# Patient Record
Sex: MALE | Race: OTHER | NOT HISPANIC OR LATINO
[De-identification: names, ages, dates, MRNs, and addresses within clinical notes are randomized per-mention and may not be internally consistent; named-entity substitution may affect disease eponyms.]

---

## 2017-01-10 ENCOUNTER — RX RENEWAL (OUTPATIENT)
Age: 57
End: 2017-01-10

## 2017-03-08 ENCOUNTER — RX RENEWAL (OUTPATIENT)
Age: 57
End: 2017-03-08

## 2017-04-28 ENCOUNTER — RX RENEWAL (OUTPATIENT)
Age: 57
End: 2017-04-28

## 2017-10-16 ENCOUNTER — APPOINTMENT (OUTPATIENT)
Dept: UROLOGY | Facility: CLINIC | Age: 57
End: 2017-10-16
Payer: COMMERCIAL

## 2017-10-16 VITALS
HEART RATE: 64 BPM | WEIGHT: 170 LBS | DIASTOLIC BLOOD PRESSURE: 80 MMHG | SYSTOLIC BLOOD PRESSURE: 145 MMHG | BODY MASS INDEX: 21.82 KG/M2 | HEIGHT: 74 IN | RESPIRATION RATE: 16 BRPM | TEMPERATURE: 98.2 F

## 2017-10-16 VITALS
SYSTOLIC BLOOD PRESSURE: 151 MMHG | DIASTOLIC BLOOD PRESSURE: 83 MMHG | RESPIRATION RATE: 16 BRPM | HEART RATE: 57 BPM | TEMPERATURE: 97.9 F

## 2017-10-16 PROCEDURE — 99213 OFFICE O/P EST LOW 20 MIN: CPT

## 2017-10-17 LAB
HIV1+2 AB SPEC QL IA.RAPID: NONREACTIVE
T PALLIDUM AB SER QL IA: NEGATIVE

## 2017-10-18 LAB
HCV AB SER QL: NONREACTIVE
HCV S/CO RATIO: 0.15 S/CO

## 2017-10-23 LAB — GONORRHOEAE AB: NORMAL

## 2018-04-20 ENCOUNTER — APPOINTMENT (OUTPATIENT)
Dept: UROLOGY | Facility: CLINIC | Age: 58
End: 2018-04-20
Payer: COMMERCIAL

## 2018-04-20 ENCOUNTER — OUTPATIENT (OUTPATIENT)
Dept: OUTPATIENT SERVICES | Facility: HOSPITAL | Age: 58
LOS: 1 days | End: 2018-04-20
Payer: COMMERCIAL

## 2018-04-20 VITALS
HEIGHT: 74 IN | DIASTOLIC BLOOD PRESSURE: 92 MMHG | WEIGHT: 170 LBS | BODY MASS INDEX: 21.82 KG/M2 | TEMPERATURE: 97.4 F | HEART RATE: 68 BPM | RESPIRATION RATE: 18 BRPM | SYSTOLIC BLOOD PRESSURE: 153 MMHG

## 2018-04-20 DIAGNOSIS — R35.0 FREQUENCY OF MICTURITION: ICD-10-CM

## 2018-04-20 PROCEDURE — 76775 US EXAM ABDO BACK WALL LIM: CPT | Mod: 26

## 2018-04-20 PROCEDURE — 76775 US EXAM ABDO BACK WALL LIM: CPT

## 2018-04-20 PROCEDURE — 99213 OFFICE O/P EST LOW 20 MIN: CPT | Mod: 25

## 2018-04-23 DIAGNOSIS — N20.0 CALCULUS OF KIDNEY: ICD-10-CM

## 2018-04-23 LAB — PSA SERPL-MCNC: 1.16 NG/ML

## 2018-10-22 ENCOUNTER — APPOINTMENT (OUTPATIENT)
Dept: UROLOGY | Facility: CLINIC | Age: 58
End: 2018-10-22

## 2018-12-10 ENCOUNTER — RX RENEWAL (OUTPATIENT)
Age: 58
End: 2018-12-10

## 2019-06-13 ENCOUNTER — RX RENEWAL (OUTPATIENT)
Age: 59
End: 2019-06-13

## 2019-10-14 ENCOUNTER — APPOINTMENT (OUTPATIENT)
Dept: UROLOGY | Facility: CLINIC | Age: 59
End: 2019-10-14
Payer: COMMERCIAL

## 2019-10-14 ENCOUNTER — OUTPATIENT (OUTPATIENT)
Dept: OUTPATIENT SERVICES | Facility: HOSPITAL | Age: 59
LOS: 1 days | End: 2019-10-14
Payer: COMMERCIAL

## 2019-10-14 VITALS
HEIGHT: 74 IN | SYSTOLIC BLOOD PRESSURE: 135 MMHG | BODY MASS INDEX: 21.82 KG/M2 | RESPIRATION RATE: 17 BRPM | TEMPERATURE: 98.3 F | WEIGHT: 170 LBS | HEART RATE: 84 BPM | DIASTOLIC BLOOD PRESSURE: 87 MMHG

## 2019-10-14 DIAGNOSIS — R35.0 FREQUENCY OF MICTURITION: ICD-10-CM

## 2019-10-14 PROCEDURE — 76775 US EXAM ABDO BACK WALL LIM: CPT

## 2019-10-14 PROCEDURE — 76775 US EXAM ABDO BACK WALL LIM: CPT | Mod: 26

## 2019-10-14 PROCEDURE — 99213 OFFICE O/P EST LOW 20 MIN: CPT | Mod: 25

## 2019-10-14 RX ORDER — ALLOPURINOL 300 MG/1
300 TABLET ORAL DAILY
Qty: 90 | Refills: 3 | Status: ACTIVE | COMMUNITY
Start: 2019-10-14 | End: 1900-01-01

## 2019-10-14 NOTE — PHYSICAL EXAM
[General Appearance - Well Nourished] : well nourished [General Appearance - Well Developed] : well developed [Normal Appearance] : normal appearance [Well Groomed] : well groomed [General Appearance - In No Acute Distress] : no acute distress [Abdomen Soft] : soft [Costovertebral Angle Tenderness] : no ~M costovertebral angle tenderness [Abdomen Tenderness] : non-tender [Urethral Meatus] : meatus normal [Urinary Bladder Findings] : the bladder was normal on palpation [Scrotum] : the scrotum was normal [Testes Mass (___cm)] : there were no testicular masses [No Prostate Nodules] : no prostate nodules [Edema] : no peripheral edema [] : no respiratory distress [Respiration, Rhythm And Depth] : normal respiratory rhythm and effort [Exaggerated Use Of Accessory Muscles For Inspiration] : no accessory muscle use [Oriented To Time, Place, And Person] : oriented to person, place, and time [Affect] : the affect was normal [Mood] : the mood was normal [Not Anxious] : not anxious [Normal Station and Gait] : the gait and station were normal for the patient's age [No Palpable Adenopathy] : no palpable adenopathy [No Focal Deficits] : no focal deficits [Prostate Size ___ gm] : prostate size [unfilled] gm [FreeTextEntry1] : smooth and regular

## 2019-10-14 NOTE — HISTORY OF PRESENT ILLNESS
[FreeTextEntry1] : 59 year old male presents for evaluation of test results. \par Pt reports some recent diarrhea when he eats after avoiding eating. \par PSA was 1.16 on 04/20/18\par Pt has been taking his medication consistent for the most part, but has not been taking HCTZ consistently. \par \par US renal today shows no evidence of stones bilaterally. There is a simple 2.6 x 2.8 x 2.7cm mid pole cyst in the right kidney and a 1.0cm lower pole cyst in the left kidney. Both kidneys are normal in size and echogenicity without hydronephrosis or solid masses visualized. \par LL on 10/03/19 shows good output, SS CaOx, elevated Ca, low citrates and low urinary pH\par Pt was advised to increase fluid intake. \par Advised to drink more lemonade and orange juice. \par Renewed Allopurinol, K Citrate and HCTZ. \par LL in 5 months and RTO in 6 months for results and US

## 2019-10-14 NOTE — ADDENDUM
[FreeTextEntry1] :  I, Steph Mahmood, acted solely as a scribe for Dr. Yonathan Faulkner. The documentation recorded by the scribe accurately reflects the service I personally performed and the decision by me.\par

## 2019-10-14 NOTE — ASSESSMENT
[FreeTextEntry1] : US renal today shows no evidence of stones bilaterally. There is a simple 2.6 x 2.8 x 2.7cm mid pole cyst in the right kidney and a 1.0cm lower pole cyst in the left kidney. Both kidneys are normal in size and echogenicity without hydronephrosis or solid masses visualized. \par LL on 10/03/19 shows good output, SS CaOx, elevated Ca, low citrates and low urinary pH\par Pt was advised to increase fluid intake. \par Advised to drink more lemonade and orange juice. \par Renewed Allopurinol, K Citrate and HCTZ. \par LL in 5 months and RTO in 6 months for results and US

## 2019-10-17 DIAGNOSIS — N20.0 CALCULUS OF KIDNEY: ICD-10-CM

## 2020-11-19 RX ORDER — POTASSIUM CITRATE 10 MEQ/1
10 MEQ TABLET, EXTENDED RELEASE ORAL TWICE DAILY
Qty: 180 | Refills: 0 | Status: ACTIVE | COMMUNITY
Start: 2019-10-14 | End: 1900-01-01

## 2021-02-18 ENCOUNTER — APPOINTMENT (OUTPATIENT)
Dept: UROLOGY | Facility: CLINIC | Age: 61
End: 2021-02-18
Payer: COMMERCIAL

## 2021-02-18 PROCEDURE — 99202 OFFICE O/P NEW SF 15 MIN: CPT | Mod: 95

## 2021-02-18 NOTE — HISTORY OF PRESENT ILLNESS
[Other Location: e.g. School (Enter Location, City,State)___] : at [unfilled], at the time of the visit. [FreeTextEntry1] : Pt.is well \par No new symptoms \par LL Showed decreased output,l ow pH, low citrate and SS UA. He also has a high urinary Na. \par Advised to increase K citrate to I bid and OJ , Decrease Na intake  \par No nocturia and no US \par Schedule office visit for rectal exam,renal US and PSA\par Pt.also has a hernia and I referred him to [facility] [Urinary Incontinence] : no urinary incontinence [Urinary Retention] : no urinary retention [Urinary Urgency] : no urinary urgency [Urinary Frequency] : no urinary frequency [Nocturia] : no nocturia [Straining] : no straining [Weak Stream] : no weak stream [Intermittency] : no intermittency [Post-Void Dribbling] : no post-void dribbling [Erectile Dysfunction] : no Erectile Dysfunction [Dysuria] : no dysuria [Hematuria - Gross] : no gross hematuria [Hematuria - Microscopic] : no microscopic hematuria [Bladder Spasm] : no bladder spasm [Abdominal Pain] : no abdominal pain [Flank Pain] : no flank pain [Weight Loss] : no recent ~M [unfilled] weight loss [Fever] : no fever [Fatigue] : no fatigue [Nausea] : no nausea [Anorexia] : no anorexia [Incisional Drainage] : no incisional drainage [Incisional Pain] : no incisional pain [None] : None

## 2021-02-18 NOTE — ASSESSMENT
[FreeTextEntry1] : hist of stones. Advised re diet \par Requires office visit for US , PSA and check rectal exam.

## 2021-03-22 ENCOUNTER — APPOINTMENT (OUTPATIENT)
Dept: SURGERY | Facility: CLINIC | Age: 61
End: 2021-03-22
Payer: COMMERCIAL

## 2021-03-22 ENCOUNTER — OUTPATIENT (OUTPATIENT)
Dept: OUTPATIENT SERVICES | Facility: HOSPITAL | Age: 61
LOS: 1 days | End: 2021-03-22
Payer: COMMERCIAL

## 2021-03-22 ENCOUNTER — APPOINTMENT (OUTPATIENT)
Dept: UROLOGY | Facility: CLINIC | Age: 61
End: 2021-03-22
Payer: COMMERCIAL

## 2021-03-22 VITALS
WEIGHT: 171 LBS | HEART RATE: 93 BPM | SYSTOLIC BLOOD PRESSURE: 124 MMHG | DIASTOLIC BLOOD PRESSURE: 83 MMHG | BODY MASS INDEX: 21.94 KG/M2 | TEMPERATURE: 98.36 F | HEIGHT: 74 IN

## 2021-03-22 DIAGNOSIS — R35.0 FREQUENCY OF MICTURITION: ICD-10-CM

## 2021-03-22 PROCEDURE — 76775 US EXAM ABDO BACK WALL LIM: CPT

## 2021-03-22 PROCEDURE — 99072 ADDL SUPL MATRL&STAF TM PHE: CPT

## 2021-03-22 PROCEDURE — 76775 US EXAM ABDO BACK WALL LIM: CPT | Mod: 26

## 2021-03-22 PROCEDURE — 99213 OFFICE O/P EST LOW 20 MIN: CPT | Mod: 25

## 2021-03-22 PROCEDURE — 99243 OFF/OP CNSLTJ NEW/EST LOW 30: CPT

## 2021-03-22 NOTE — ASSESSMENT
[FreeTextEntry1] : 59 yo male with a LIH and umbilical hernia. Will schedule out patient Laps repair

## 2021-03-22 NOTE — PHYSICAL EXAM
[General Appearance - Well Developed] : well developed [General Appearance - Well Nourished] : well nourished [Normal Appearance] : normal appearance [Well Groomed] : well groomed [General Appearance - In No Acute Distress] : no acute distress [Anus Abnormality] : the anus and perineum were normal [Rectal Exam - Rectum] : digital rectal exam was normal [Prostate Tenderness] : the prostate was not tender [No Prostate Nodules] : no prostate nodules [Prostate Size ___ gm] : prostate size [unfilled] gm

## 2021-03-22 NOTE — HISTORY OF PRESENT ILLNESS
[de-identified] : 61 yo healthy male presents for evaluation of a left inguinal hernia which he has had for some time. He states recent increase in discomfort especially after prolonged  coughing and sneezing. He denies any h/o hernia repairs in the past. He denies any nausea or vomiting.

## 2021-03-22 NOTE — ASSESSMENT
[FreeTextEntry1] : Renal US today:\par Right kidney: 11.5 x 4.9 x 5.1 cm \par Cortical Thickness: 1.6 cm UP 1.6 cm LP \par \par Left kidney: 12.3 x 5.4 x 4.9 cm		 \par Cortical Thickness: 1.6 cm UP 1.7 cm LP \par  \par Echogenicity: Normal\par \par Bladder: Not visualized \par \par Findings:Again visualized a 2.5 cm x 1.9 cm x 2.6 cm exophytic cyst with a 8.2 mm x 5.5 mm calcification in mid pole of the right kidney. Again visualized a 1.1 cm x 1.1 cm 1.2 cm simple cyst in the lower pole of the left kidney. Both kidneys are normal in size and echogenicity without hydronephrosis, stones or solid masses visualized. \par LL discussed previously \par Asymptomatic \par PSA ordered \par LL in 5 months and US in 6 months

## 2021-03-22 NOTE — PHYSICAL EXAM
[Respiratory Effort] : normal respiratory effort [Alert] : alert [Oriented to Person] : oriented to person [Oriented to Place] : oriented to place [Oriented to Time] : oriented to time [Calm] : calm [JVD] : no jugular venous distention  [Abdominal Masses] : No abdominal masses [Abdomen Tenderness] : ~T ~M No abdominal tenderness [de-identified] : KAVON DAVIS NAD [de-identified] : EOMI [de-identified] : soft NT ND + reducible LIH no obvious RIH appreciated. tiny umbilical hernia

## 2021-03-23 LAB — PSA SERPL-MCNC: 0.94 NG/ML

## 2021-03-25 DIAGNOSIS — N20.0 CALCULUS OF KIDNEY: ICD-10-CM

## 2021-03-25 DIAGNOSIS — Z87.442 PERSONAL HISTORY OF URINARY CALCULI: ICD-10-CM

## 2021-03-25 DIAGNOSIS — N28.1 CYST OF KIDNEY, ACQUIRED: ICD-10-CM

## 2021-08-23 ENCOUNTER — APPOINTMENT (OUTPATIENT)
Dept: SURGERY | Facility: CLINIC | Age: 61
End: 2021-08-23
Payer: COMMERCIAL

## 2021-08-23 VITALS
BODY MASS INDEX: 22.59 KG/M2 | SYSTOLIC BLOOD PRESSURE: 148 MMHG | HEIGHT: 74 IN | OXYGEN SATURATION: 95 % | TEMPERATURE: 97.8 F | WEIGHT: 176 LBS | DIASTOLIC BLOOD PRESSURE: 83 MMHG | HEART RATE: 59 BPM

## 2021-08-23 VITALS — SYSTOLIC BLOOD PRESSURE: 149 MMHG | DIASTOLIC BLOOD PRESSURE: 83 MMHG

## 2021-08-23 PROCEDURE — 99204 OFFICE O/P NEW MOD 45 MIN: CPT

## 2021-08-23 NOTE — REVIEW OF SYSTEMS
[As Noted in HPI] : as noted in HPI [Fever] : no fever [Chills] : no chills [Eye Pain] : no eye pain [Red Eyes] : eyes not red [Earache] : no earache [Loss Of Hearing] : no hearing loss [Heart Rate Is Slow] : the heart rate was not slow [Heart Rate Is Fast] : the heart rate was not fast [Shortness Of Breath] : no shortness of breath [Wheezing] : no wheezing [Abdominal Pain] : no abdominal pain [Vomiting] : no vomiting [Constipation] : no constipation [Diarrhea] : no diarrhea [Dysuria] : no dysuria [Incontinence] : no incontinence [Hesitancy] : no urinary hesitancy [Nocturia] : no nocturia [Joint Swelling] : no joint swelling [Joint Stiffness] : no joint stiffness [Skin Lesions] : no skin lesions [Skin Wound] : no skin wound [Confused] : no confusion [Convulsions] : no convulsions [Suicidal] : not suicidal [Sleep Disturbances] : no sleep disturbances [Proptosis] : no proptosis [Hot Flashes] : no hot flashes [Easy Bleeding] : no tendency for easy bleeding [Easy Bruising] : no tendency for easy bruising

## 2021-08-23 NOTE — HISTORY OF PRESENT ILLNESS
[de-identified] : 60M presents with 10month history of L groin bulge that is increasingly bothersome. Worse with valsalva and prolonged standing. No dysuria.\par No episodes of incarceration or acute changes in bowel function.\par No nausea/vomiting.\par No blood per rectum\par No testicular pain.\par Long-standing umbilical hernia as well.

## 2021-08-23 NOTE — PHYSICAL EXAM
[Normal Breath Sounds] : Normal breath sounds [Normal Heart Sounds] : normal heart sounds [Normal Rate and Rhythm] : normal rate and rhythm [No Rash or Lesion] : No rash or lesion [No HSM] : no hepatosplenomegaly [Alert] : alert [Oriented to Person] : oriented to person [Oriented to Place] : oriented to place [Oriented to Time] : oriented to time [Calm] : calm [JVD] : no jugular venous distention  [de-identified] : well-appearing, NAD [de-identified] : RASHARD [de-identified] : soft, ND, NT, +reducible umbilical hernia, +reducible R and L inguinal hernias, no erythema. [de-identified] : no CVAT [de-identified] : full ROM

## 2021-08-23 NOTE — ASSESSMENT
[FreeTextEntry1] : 60M with reducible/symptomatic bilateral inguinal hernias and umbilical hernia. Recommend robotic repair.

## 2021-09-17 ENCOUNTER — APPOINTMENT (OUTPATIENT)
Dept: UROLOGY | Facility: CLINIC | Age: 61
End: 2021-09-17

## 2021-11-08 ENCOUNTER — OUTPATIENT (OUTPATIENT)
Dept: OUTPATIENT SERVICES | Facility: HOSPITAL | Age: 61
LOS: 1 days | End: 2021-11-08
Payer: COMMERCIAL

## 2021-11-08 VITALS
OXYGEN SATURATION: 98 % | RESPIRATION RATE: 16 BRPM | TEMPERATURE: 97 F | HEART RATE: 60 BPM | SYSTOLIC BLOOD PRESSURE: 122 MMHG | DIASTOLIC BLOOD PRESSURE: 60 MMHG | WEIGHT: 174.61 LBS | HEIGHT: 74 IN

## 2021-11-08 DIAGNOSIS — Z98.890 OTHER SPECIFIED POSTPROCEDURAL STATES: Chronic | ICD-10-CM

## 2021-11-08 DIAGNOSIS — K40.90 UNILATERAL INGUINAL HERNIA, WITHOUT OBSTRUCTION OR GANGRENE, NOT SPECIFIED AS RECURRENT: ICD-10-CM

## 2021-11-08 DIAGNOSIS — E06.3 AUTOIMMUNE THYROIDITIS: ICD-10-CM

## 2021-11-08 DIAGNOSIS — Z92.29 PERSONAL HISTORY OF OTHER DRUG THERAPY: ICD-10-CM

## 2021-11-08 DIAGNOSIS — K40.20 BILATERAL INGUINAL HERNIA, WITHOUT OBSTRUCTION OR GANGRENE, NOT SPECIFIED AS RECURRENT: ICD-10-CM

## 2021-11-08 DIAGNOSIS — Z29.9 ENCOUNTER FOR PROPHYLACTIC MEASURES, UNSPECIFIED: ICD-10-CM

## 2021-11-08 DIAGNOSIS — Z01.818 ENCOUNTER FOR OTHER PREPROCEDURAL EXAMINATION: ICD-10-CM

## 2021-11-08 DIAGNOSIS — K42.9 UMBILICAL HERNIA WITHOUT OBSTRUCTION OR GANGRENE: ICD-10-CM

## 2021-11-08 DIAGNOSIS — Z13.89 ENCOUNTER FOR SCREENING FOR OTHER DISORDER: ICD-10-CM

## 2021-11-08 LAB
A1C WITH ESTIMATED AVERAGE GLUCOSE RESULT: 5.4 % — SIGNIFICANT CHANGE UP (ref 4–5.6)
ALBUMIN SERPL ELPH-MCNC: 4.3 G/DL — SIGNIFICANT CHANGE UP (ref 3.3–5.2)
ALP SERPL-CCNC: 62 U/L — SIGNIFICANT CHANGE UP (ref 40–120)
ALT FLD-CCNC: 20 U/L — SIGNIFICANT CHANGE UP
ANION GAP SERPL CALC-SCNC: 10 MMOL/L — SIGNIFICANT CHANGE UP (ref 5–17)
APTT BLD: 33.6 SEC — SIGNIFICANT CHANGE UP (ref 27.5–35.5)
AST SERPL-CCNC: 20 U/L — SIGNIFICANT CHANGE UP
BASOPHILS # BLD AUTO: 0.07 K/UL — SIGNIFICANT CHANGE UP (ref 0–0.2)
BASOPHILS NFR BLD AUTO: 0.8 % — SIGNIFICANT CHANGE UP (ref 0–2)
BILIRUB SERPL-MCNC: 0.4 MG/DL — SIGNIFICANT CHANGE UP (ref 0.4–2)
BLD GP AB SCN SERPL QL: SIGNIFICANT CHANGE UP
BUN SERPL-MCNC: 21.1 MG/DL — HIGH (ref 8–20)
CALCIUM SERPL-MCNC: 9.5 MG/DL — SIGNIFICANT CHANGE UP (ref 8.6–10.2)
CHLORIDE SERPL-SCNC: 106 MMOL/L — SIGNIFICANT CHANGE UP (ref 98–107)
CO2 SERPL-SCNC: 25 MMOL/L — SIGNIFICANT CHANGE UP (ref 22–29)
CREAT SERPL-MCNC: 1.18 MG/DL — SIGNIFICANT CHANGE UP (ref 0.5–1.3)
EOSINOPHIL # BLD AUTO: 0.31 K/UL — SIGNIFICANT CHANGE UP (ref 0–0.5)
EOSINOPHIL NFR BLD AUTO: 3.8 % — SIGNIFICANT CHANGE UP (ref 0–6)
ESTIMATED AVERAGE GLUCOSE: 108 MG/DL — SIGNIFICANT CHANGE UP (ref 68–114)
GLUCOSE SERPL-MCNC: 100 MG/DL — HIGH (ref 70–99)
HCT VFR BLD CALC: 46.2 % — SIGNIFICANT CHANGE UP (ref 39–50)
HGB BLD-MCNC: 15.7 G/DL — SIGNIFICANT CHANGE UP (ref 13–17)
IMM GRANULOCYTES NFR BLD AUTO: 0.2 % — SIGNIFICANT CHANGE UP (ref 0–1.5)
INR BLD: 1.03 RATIO — SIGNIFICANT CHANGE UP (ref 0.88–1.16)
LYMPHOCYTES # BLD AUTO: 2.33 K/UL — SIGNIFICANT CHANGE UP (ref 1–3.3)
LYMPHOCYTES # BLD AUTO: 28.2 % — SIGNIFICANT CHANGE UP (ref 13–44)
MCHC RBC-ENTMCNC: 32.4 PG — SIGNIFICANT CHANGE UP (ref 27–34)
MCHC RBC-ENTMCNC: 34 GM/DL — SIGNIFICANT CHANGE UP (ref 32–36)
MCV RBC AUTO: 95.5 FL — SIGNIFICANT CHANGE UP (ref 80–100)
MONOCYTES # BLD AUTO: 0.63 K/UL — SIGNIFICANT CHANGE UP (ref 0–0.9)
MONOCYTES NFR BLD AUTO: 7.6 % — SIGNIFICANT CHANGE UP (ref 2–14)
MRSA PCR RESULT.: SIGNIFICANT CHANGE UP
NEUTROPHILS # BLD AUTO: 4.9 K/UL — SIGNIFICANT CHANGE UP (ref 1.8–7.4)
NEUTROPHILS NFR BLD AUTO: 59.4 % — SIGNIFICANT CHANGE UP (ref 43–77)
PLATELET # BLD AUTO: 176 K/UL — SIGNIFICANT CHANGE UP (ref 150–400)
POTASSIUM SERPL-MCNC: 4.3 MMOL/L — SIGNIFICANT CHANGE UP (ref 3.5–5.3)
POTASSIUM SERPL-SCNC: 4.3 MMOL/L — SIGNIFICANT CHANGE UP (ref 3.5–5.3)
PROT SERPL-MCNC: 7.1 G/DL — SIGNIFICANT CHANGE UP (ref 6.6–8.7)
PROTHROM AB SERPL-ACNC: 11.9 SEC — SIGNIFICANT CHANGE UP (ref 10.6–13.6)
RBC # BLD: 4.84 M/UL — SIGNIFICANT CHANGE UP (ref 4.2–5.8)
RBC # FLD: 12.9 % — SIGNIFICANT CHANGE UP (ref 10.3–14.5)
S AUREUS DNA NOSE QL NAA+PROBE: SIGNIFICANT CHANGE UP
SODIUM SERPL-SCNC: 141 MMOL/L — SIGNIFICANT CHANGE UP (ref 135–145)
WBC # BLD: 8.26 K/UL — SIGNIFICANT CHANGE UP (ref 3.8–10.5)
WBC # FLD AUTO: 8.26 K/UL — SIGNIFICANT CHANGE UP (ref 3.8–10.5)

## 2021-11-08 PROCEDURE — G0463: CPT

## 2021-11-08 PROCEDURE — 93005 ELECTROCARDIOGRAM TRACING: CPT

## 2021-11-08 PROCEDURE — 93010 ELECTROCARDIOGRAM REPORT: CPT

## 2021-11-08 RX ORDER — CEFAZOLIN SODIUM 1 G
2000 VIAL (EA) INJECTION ONCE
Refills: 0 | Status: COMPLETED | OUTPATIENT
Start: 2021-11-24 | End: 2021-11-24

## 2021-11-08 NOTE — H&P PST ADULT - PROBLEM SELECTOR PLAN 2
Moderna vaccine completed 4/19/21  Booster received 8/24/21  Patient to schedule COVID test for 11/21.

## 2021-11-08 NOTE — H&P PST ADULT - ASSESSMENT
This is a pleasant 61 year old male in NAD  with history of kidney stones, squamous cell carcinoma and hashimoto's thyroiditis presents today for PST for upcoming surgical procedure. Patient with bilateral inguinal hernia and umbilical hernia.  Now scheduled for robotic assisted bilateral inguinal hernia repair and umbilical hernia repair with Dr. Morris pending medical clearance on .      CAPRINI SCORE    AGE RELATED RISK FACTORS                                                             [ ] Age 41-60 years                                            (1 Point)  [X ] Age: 61-74 years                                           (2 Points)                 [ ] Age= 75 years                                                (3 Points)             DISEASE RELATED RISK FACTORS                                                       [ ] Edema in the lower extremities                 (1 Point)                     [ ] Varicose veins                                               (1 Point)                                 [ ] BMI > 25 Kg/m2                                            (1 Point)                                  [ ] Serious infection (ie PNA)                            (1 Point)                     [ ] Lung disease ( COPD, Emphysema)            (1 Point)                                                                          [ ] Acute myocardial infarction                         (1 Point)                  [ ] Congestive heart failure (in the previous month)  (1 Point)         [ ] Inflammatory bowel disease                            (1 Point)                  [ ] Central venous access, PICC or Port               (2 points)       (within the last month)                                                                [ ] Stroke (in the previous month)                        (5 Points)    [X ] Previous or present malignancy                       (2 points)                                                                                                                                                         HEMATOLOGY RELATED FACTORS                                                         [ ] Prior episodes of VTE                                     (3 Points)                     [ ] Positive family history for VTE                      (3 Points)                  [ ] Prothrombin 40631 A                                     (3 Points)                     [ ] Factor V Leiden                                                (3 Points)                        [ ] Lupus anticoagulants                                      (3 Points)                                                           [ ] Anticardiolipin antibodies                              (3 Points)                                                       [ ] High homocysteine in the blood                   (3 Points)                                             [ ] Other congenital or acquired thrombophilia      (3 Points)                                                [ ] Heparin induced thrombocytopenia                  (3 Points)                                        MOBILITY RELATED FACTORS  [ ] Bed rest                                                         (1 Point)  [ ] Plaster cast                                                    (2 points)  [ ] Bed bound for more than 72 hours           (2 Points)    GENDER SPECIFIC FACTORS  [ ] Pregnancy or had a baby within the last month   (1 Point)  [ ] Post-partum < 6 weeks                                   (1 Point)  [ ] Hormonal therapy  or oral contraception   (1 Point)  [ ] History of pregnancy complications              (1 point)  [ ] Unexplained or recurrent              (1 Point)    OTHER RISK FACTORS                                           (1 Point)  [X ] BMI >40, smoking, diabetes requiring insulin, chemotherapy  blood transfusions and length of surgery over 2 hours    SURGERY RELATED RISK FACTORS  [ ]  Section within the last month     (1 Point)  [ ] Minor surgery                                                  (1 Point)  [ ] Arthroscopic surgery                                       (2 Points)  [X ] Planned major surgery lasting more            (2 Points)      than 45 minutes     [ ] Elective hip or knee joint replacement       (5 points)       surgery                                                TRAUMA RELATED RISK FACTORS  [ ] Fracture of the hip, pelvis, or leg                       (5 Points)  [ ] Spinal cord injury resulting in paralysis             (5 points)       (in the previous month)    [ ] Paralysis  (less than 1 month)                             (5 Points)  [ ] Multiple Trauma within 1 month                        (5 Points)    Total Score [     7   ]    Caprini Score 0-2: Low Risk, NO VTE prophylaxis required for most patients, encourage ambulation  Caprini Score 3-6: Moderate Risk , pharmacologic VTE prophylaxis is indicated for most patients (in the absence of contraindications)  Caprini Score Greater than or =7: High risk, pharmocologic VTE prophylaxis indicated for most patients (in the absence of contraindications)      OPIOID RISK TOOL    ALONZO EACH BOX THAT APPLIES AND ADD TOTALS AT THE END    FAMILY HISTORY OF SUBSTANCE ABUSE                 FEMALE         MALE                                                Alcohol                             [  ]1 pt          [  ]3pts                                               Illegal Durgs                     [  ]2 pts        [  ]3pts                                               Rx Drugs                           [  ]4 pts        [  ]4 pts    PERSONAL HISTORY OF SUBSTANCE ABUSE                                                                                          Alcohol                             [  ]3 pts       [  ]3 pts                                               Illegal Drugs                     [  ]4 pts        [  ]4 pts                                               Rx Drugs                           [  ]5 pts        [  ]5 pts    AGE BETWEEN 16-45 YEARS                                      [  ]1 pt         [  ]1 pt    HISTORY OF PREADOLESCENT   SEXUAL ABUSE                                                             [  ]3 pts        [  ]0pts    PSYCHOLOGICAL DISEASE                     ADD, OCD, Bipolar, Schizophrenia        [  ]2 pts         [  ]2 pts                      Depression                                               [  ]1 pt           [  ]1 pt           SCORING TOTAL   (add numbers and type here)              (*0**)                                     A score of 3 or lower indicated LOW risk for future opioid abuse  A score of 4 to 7 indicated moderate risk for future opioid abuse  A score of 8 or higher indicates a high risk for opioid abuse

## 2021-11-08 NOTE — H&P PST ADULT - PROBLEM SELECTOR PROBLEM 5
Unilateral inguinal hernia, without obstruction or gangrene, not specified as recurrent Umbilical hernia without obstruction or gangrene

## 2021-11-08 NOTE — H&P PST ADULT - GASTROINTESTINAL COMMENTS
umbilical hernia x15 years, protruding left inguinal hernia reducible umbilical hernia, reducible right inguinal hernia, large reducible left inguinal hernia

## 2021-11-08 NOTE — H&P PST ADULT - NSICDXPASTMEDICALHX_GEN_ALL_CORE_FT
PAST MEDICAL HISTORY:  COVID-19 vaccine series completed moderna x3    H/O squamous cell carcinoma of skin     Hashimoto's thyroiditis     History of kidney stones

## 2021-11-08 NOTE — H&P PST ADULT - PROBLEM SELECTOR PLAN 5
Labs, EKG and MRSA/MSSA performed. Written and verbal instructions provided, verbalized understanding.  Now scheduled for robotic assisted bilateral inguinal hernia repair and umbilical hernia repair with Dr. Morris pending medical clearance on 11/24.   Patient to have medical clearance with Dr. Matamoros.  Patient educated on surgical scrub, COVID testing 11/21, preadmission instructions, medical clearance and day of procedure medications, verbalizes understanding, teach back method utilized.  Patient instructed to stop ASA/Herbals or anti-inflammatory meds one week prior to surgery and discuss with PCP

## 2021-11-08 NOTE — H&P PST ADULT - PROBLEM SELECTOR PLAN 1
Continue medications as instructed.  Patient instructed to take levothyroxine with a sip of water morning of procedure, verbalizes understanding.  Medical clearance pending.

## 2021-11-08 NOTE — H&P PST ADULT - HISTORY OF PRESENT ILLNESS
61 year old male with history of   presents today for PST  61 year old male with history of   presents today for PST   with history of umbilical hernia   reports September 2019 after lifting tree  progressively worsened  burning sensation with bowel movements, sneezing   61 year old male with history of kidney stones, squamous cell carcinoma and hashimoto's thyroiditis presents today for PST for upcoming surgical procedure. Patient reports left inguinal hernia since September 2019 after lifting a tree. States hernia has progressively worsened and "bulge" has progressively gotten bigger. Reports a burning sensation with bowel movements, sneezing and prolonged standing. Reports longstanding history of umbilical hernia, with no pain. Reports occasional diet related diarrhea. Denies nausea, vomiting, constipation or testicular pain. Denies chest pain, palpitations or shortness of breath. Now scheduled for robotic assisted bilateral inguinal hernia repair and umbilical hernia repair with Dr. Morris pending medical clearance on 11/24.

## 2021-11-08 NOTE — H&P PST ADULT - LIVES WITH, PROFILE
placement need - homeless. Pt  was seen evaluated and discharged last evening in the Ed after having an anxiety attack. Pt requests placement with [facility]. Whilel conversing with [facility], pt expressed "If I do not get admitted I am going to jump in front of a car". Pt admits to this not being true, only wants placement.
spouse

## 2021-11-08 NOTE — H&P PST ADULT - NSANTHOSAYNRD_GEN_A_CORE
No. CARMEL screening performed.  STOP BANG Legend: 0-2 = LOW Risk; 3-4 = INTERMEDIATE Risk; 5-8 = HIGH Risk

## 2021-11-08 NOTE — H&P PST ADULT - NSICDXPASTSURGICALHX_GEN_ALL_CORE_FT
PAST SURGICAL HISTORY:  H/O colonoscopy      PAST SURGICAL HISTORY:  H/O colonoscopy     History of lithotripsy     History of squamous cell carcinoma excision scalp    Status post Mohs surgery torso, nose

## 2021-11-08 NOTE — H&P PST ADULT - PROBLEM SELECTOR PROBLEM 6
Umbilical hernia without obstruction or gangrene Bilateral inguinal hernia without obstruction or gangrene, recurrence not specified

## 2021-11-08 NOTE — H&P PST ADULT - VASCULAR
OT ACUTE Treatment Session    Pt seen on 9S nursing unit.                                                          Frequency Comments: X  M,T,Th,F      Admitting complaint:: Non-Stemi  Elevated troponin                                                                                         Precautions  Cardiac Precautions: Yes (06/07/19 1125)  Other Precautions: fall risk (06/07/19 1125)      ASSESSMENT:  Treatment today focused on functional mobility, standing balance/tolerance and overall activity tolerance for progressing independence in self cares.   Current overall ADL status is supervision/set up to complete bathing, grooming and UB dressing tasks given verbal cues to initiate and min assist for washing of distal LE's due to limited reach.  Current functional mobility for ADL and Instrumental-ADL tasks is overall supervision for safety in sit to stand transfers, maintaining standing balance for self cares and min assist for balance, safety and support ambulating household distances with w/w.  Patient  displays  fair progress demonstrated by good effort and particiption.    Limitations at this time include weakness, fatigue, cognitive deficits, balance and medical status. Patient will benefit from further skilled OT for continued training with increasing strength and activity tolerance to help the patient meet goal of maximizing safety and independence in mobility and self cares; discharge planning options and therapy progress made to date with Patient.       RECOMMENDATIONS FOR DISCHARGE:  Recommendations for Discharge: OT: Sub-acute nursing home (06/07/19 1125)    OT Identified Barriers to Discharge: weakness, fatigue, medical status, overall safety     PT/OT Mobility Equipment for Discharge: has 4ww at home (05/30/19 1100)  PT/OT ADL Equipment for Discharge: may benefit from shower chair; continue to assess (06/07/19 1125)    Assistance needed when returning home:   Discussed plan for patient to discharge to  sub acute rehab facility for ongoing rehabilitation due to ongoing functional limitations    ICU Mobility Assesment (PERME):         PLAN: Continue skilled OT, including the following Treatment Interventions: ADL retraining;Functional transfer training;Endurance training;Cognitive reorientation;Equipment eval/education;Compensatory technique education;Continued evaluation (06/07/19 1125)   Treatment Plan for Next Session: ambulate to bathroom for toileting and self cares at sink  Additional Plan Considerations: grooming/oral cares in stance to increase standing tolerance       EDUCATION:   On this date, the patient was educated on maintaining independence in self cares.    The response to education was: Verbalizes understanding and Needs reinforcement                                                    SUBJECTIVE: Patient's Personal Goal: did not state (06/07/19 1125)   Subjective: Pt states \"I get so tired.\" while completing adls. (06/07/19 1125)  Subjective/Objective Comments: POWER Vila ok'd session; pt returned to recliner with all needs in reach and sitter/RN present in room. (06/07/19 1125)    OBJECTIVE:Basic Lines: Telemetry;Capped IV (06/07/19 1125)  Safety Measures: Alarms;Sitter(call light in reach) (06/07/19 1125)    RN reported Ferguson Fall Scale Score: 100       Last 24 hours of Functional Data     ADLs  Self Cares/ADL's  Grooming Assistance: Supervision;Set-up;Sitting at sink (06/07/19 1125)  Grooming/Oral Hygiene Deficit: Setup;Verbal cueing;Supervision/Safety;Wash/dry hands;Wash/dry face(verbal cues to initiate tasks) (06/07/19 1125)  Bathing Assistance: Supervision;Set-up;Standing at sink;Sitting at sink;Minimal Assist (Min) (06/07/19 1125)  Bathing Deficit: Setup;Verbal cueing;Increased time to complete;Chest;Left arm;Right arm;Abdomen;Perineal area;Buttocks;Left upper leg;Right upper leg;Left lower leg including foot;Right lower leg including foot;Use of adaptive equipment(min for LE's to feet;  verbal cues to initiate all tasks) (06/07/19 1125)  Bathing Equipment Used: Chair at sink (06/07/19 1125)  Upper Body Dressing Assistance: Supervision;Standing at sink;Sitting at sink (06/07/19 1125)  Upper Body Dressing Deficit: Supervision/Safety;Thread RUE;Thread LUE;Pull over head;Pull around back(able to funmilayo tshirt and funmilayo robe) (06/07/19 1125)  Self Cares/ADL's Comments #1: Pt with little self initiation for adl tasks, verbal cues required to initiate throughout session. (06/07/19 1125)    Household mobility  Household Mobility  Sit to Stand: Minimal Assist (Min) (06/07/19 1125)  Stand to Sit: Minimal Assist (Min) (06/07/19 1125)  Stand Pivot Transfers: Minimal Assist (Min) (06/07/19 1125)  Transfer Equipment: gait belt, w/w (06/07/19 1125)  Sitting - Static: Supervision (06/07/19 1125)  Sitting - Dynamic: Supervision (06/07/19 1125)  Standing - Static: Supervision (06/07/19 1125)  Standing - Dynamic: Supervision(at sink) (06/07/19 1125)  Household Mobility Comments #1: Pt currently ambuating to/from bathroom and household distances in room with min assist for balance, safety and support with ww. (06/07/19 1125)    Home Management  Home Management Skills  Home Management Skills Comments: Receives assist from AL staff as needed. (06/07/19 1125)    Other Interventions         Tolerance  OT Activity Tolerance  Activity Tolerance: 1:1 Activity to rest (06/07/19 1125)  Vital Signs: stable (06/07/19 1125)  Activity Tolerance Comments: fair (06/07/19 1125)    Cognition  Communication/Cognition  Communication: Clear speech (06/07/19 1125)  Overall Cognitive Status: Impaired (06/07/19 1125)  Attention: Attends with cues to redirect (06/07/19 1125)  Executive Functions: Impaired initiation (06/07/19 1125)  Following Verbal Directions: Follows one step directions with increased time;Follows one step directions with repetition (06/07/19 1125)  Following Demonstrated Directions: Follows one step directions with increased  time;Follows one step directions with repetition (06/07/19 1125)  Safety Judgement: Decreased awareness of need for assistance;Decreased awareness of need for safety (06/07/19 1125)  Awareness of Deficits: Decreased awareness of deficits (06/07/19 1125)  Cognition Comments: appears slow to process; baseline unclear (06/07/19 1125)    Patient's Personal Goal: did not state (06/07/19 1125)    Therapy Goals:    Goals  Short Term Goals to Be Reviewed On: 06/06/19 (05/30/19 1320)  Short Term Goals Are The Same as Discharge Goals: No (05/30/19 1320)  Goal Agreement: Patient agrees with goals and treatment plan (05/30/19 1320)  Grooming Discharge Goal 1: modified independence  (05/30/19 1320)  Grooming Discharge Goal Progress 1: Outcome not met, continue to monitor (05/30/19 1320)  Bathing Short Term Goal 1: upper body with sup (05/30/19 1320)  Bathing Discharge Goal 1: upper body with modified independence  (05/30/19 1320)  Bathing Discharge Goal Progress 1: Outcome not met, continue to monitor (05/30/19 1320)  Bathing Short Term Goal 2: lower body bathing with mod A with AE as needed  (05/30/19 1320)  Bathing Discharge Goal 2: lower body bathing with S/u and AE as needed  (05/30/19 1320)  Bathing Discharge Goal Progress 2: Outcome not met, continue to monitor (05/30/19 1320)  Dressing Short Term Goal 1: funmilayo shirt with setup (05/30/19 1320)  Dressing Discharge Goal 1: funmilayo shirt with modified independence (05/30/19 1320)  Dressing Discharge Goal Progress 1: Outcome not met, continue to monitor (05/30/19 1320)  Dressing Short Term Goal 2: funmilayo lower body clothing with max assist of one with ae prn  (05/30/19 1320)  Dressing Discharge Goal 2: funmilayo lower body cltoihing with min assist of one and ae as needed  (05/30/19 1320)  Dressing Discharge Goal Progress 2: Outcome not met, continue to monitor (05/30/19 1320)  Toileting Short Term Goal 1: min Assist with vladimir-cares and clothing management while maintianing safety   (05/30/19 1320)  Toileting Discharge Goal 1: modified independent (05/30/19 1320)  Toileting Discharge Goal Progress 1: Outcome not met, continue to monitor (05/30/19 1320)  Home Setting Transfer Short Term Goal 1: mod A of 1 with toilet transfers while maintianing sfaety  (05/30/19 1320)  Home Setting Transfer Discharge Goal 1: toilet transfers modified independent.  (05/30/19 1320)  Home Setting Transfer Discharge Goal Progress 1: Outcome not met, continue to monitor (05/30/19 1320)  Home Setting Transfer Short Term Goal 2: step over tub transfer with min assist (05/30/19 1320)  Home Setting Transfer Discharge Goal 2: step over tub transfers with supervision  (05/30/19 1320)  Home Setting Transfer Discharge Goal Progress 2: Outcome not met, continue to monitor (05/30/19 1320)  Home Management Discharge Goal 1: item retrieval with ww with modified independence for safety  (05/30/19 1320)  Home Management Discharge Goal Progress 1: Outcome not met, continue to monitor (05/30/19 1320)        Total Treatment Time:  OT Time Spent: 45 minutes (06/07/19 1126)    See OT flowsheet for full details regarding the OT therapy provided.      detailed exam

## 2021-11-08 NOTE — H&P PST ADULT - NSICDXFAMILYHX_GEN_ALL_CORE_FT
FAMILY HISTORY:  Mother  Still living? Unknown  FH: breast cancer, Age at diagnosis: Age Unknown     FAMILY HISTORY:  Mother  Still living? Unknown  FH: breast cancer, Age at diagnosis: Age Unknown  FH: rheumatoid arthritis, Age at diagnosis: Age Unknown

## 2021-11-21 ENCOUNTER — APPOINTMENT (OUTPATIENT)
Dept: DISASTER EMERGENCY | Facility: CLINIC | Age: 61
End: 2021-11-21

## 2021-11-22 LAB — SARS-COV-2 N GENE NPH QL NAA+PROBE: NOT DETECTED

## 2021-11-23 ENCOUNTER — TRANSCRIPTION ENCOUNTER (OUTPATIENT)
Age: 61
End: 2021-11-23

## 2021-11-24 ENCOUNTER — APPOINTMENT (OUTPATIENT)
Dept: SURGERY | Facility: HOSPITAL | Age: 61
End: 2021-11-24

## 2021-11-24 ENCOUNTER — OUTPATIENT (OUTPATIENT)
Dept: INPATIENT UNIT | Facility: HOSPITAL | Age: 61
LOS: 1 days | End: 2021-11-24
Payer: COMMERCIAL

## 2021-11-24 VITALS
OXYGEN SATURATION: 100 % | DIASTOLIC BLOOD PRESSURE: 94 MMHG | HEART RATE: 60 BPM | RESPIRATION RATE: 16 BRPM | TEMPERATURE: 97 F | SYSTOLIC BLOOD PRESSURE: 137 MMHG | WEIGHT: 174.61 LBS | HEIGHT: 74 IN

## 2021-11-24 VITALS
OXYGEN SATURATION: 97 % | RESPIRATION RATE: 13 BRPM | TEMPERATURE: 98 F | DIASTOLIC BLOOD PRESSURE: 99 MMHG | SYSTOLIC BLOOD PRESSURE: 126 MMHG | HEART RATE: 80 BPM

## 2021-11-24 DIAGNOSIS — Z98.890 OTHER SPECIFIED POSTPROCEDURAL STATES: Chronic | ICD-10-CM

## 2021-11-24 DIAGNOSIS — K40.90 UNILATERAL INGUINAL HERNIA, WITHOUT OBSTRUCTION OR GANGRENE, NOT SPECIFIED AS RECURRENT: ICD-10-CM

## 2021-11-24 DIAGNOSIS — K42.9 UMBILICAL HERNIA WITHOUT OBSTRUCTION OR GANGRENE: ICD-10-CM

## 2021-11-24 PROCEDURE — 49585: CPT | Mod: 59

## 2021-11-24 PROCEDURE — 49650 LAP ING HERNIA REPAIR INIT: CPT | Mod: 50

## 2021-11-24 PROCEDURE — S2900: CPT

## 2021-11-24 PROCEDURE — 49650 LAP ING HERNIA REPAIR INIT: CPT | Mod: 59

## 2021-11-24 PROCEDURE — C1781: CPT

## 2021-11-24 PROCEDURE — S2900 ROBOTIC SURGICAL SYSTEM: CPT | Mod: NC

## 2021-11-24 PROCEDURE — 49585: CPT | Mod: AS,59

## 2021-11-24 PROCEDURE — 49585: CPT

## 2021-11-24 PROCEDURE — 49650 LAP ING HERNIA REPAIR INIT: CPT | Mod: AS,50

## 2021-11-24 RX ORDER — ACETAMINOPHEN 500 MG
975 TABLET ORAL ONCE
Refills: 0 | Status: COMPLETED | OUTPATIENT
Start: 2021-11-24 | End: 2021-11-24

## 2021-11-24 RX ORDER — BUPIVACAINE 13.3 MG/ML
20 INJECTION, SUSPENSION, LIPOSOMAL INFILTRATION ONCE
Refills: 0 | Status: DISCONTINUED | OUTPATIENT
Start: 2021-11-24 | End: 2021-11-24

## 2021-11-24 RX ORDER — FENTANYL CITRATE 50 UG/ML
50 INJECTION INTRAVENOUS
Refills: 0 | Status: DISCONTINUED | OUTPATIENT
Start: 2021-11-24 | End: 2021-11-24

## 2021-11-24 RX ORDER — ASPIRIN/CALCIUM CARB/MAGNESIUM 324 MG
0 TABLET ORAL
Qty: 0 | Refills: 0 | DISCHARGE

## 2021-11-24 RX ORDER — SODIUM CHLORIDE 9 MG/ML
3 INJECTION INTRAMUSCULAR; INTRAVENOUS; SUBCUTANEOUS ONCE
Refills: 0 | Status: DISCONTINUED | OUTPATIENT
Start: 2021-11-24 | End: 2021-11-24

## 2021-11-24 RX ORDER — LEVOTHYROXINE SODIUM 125 MCG
1 TABLET ORAL
Qty: 0 | Refills: 0 | DISCHARGE

## 2021-11-24 RX ORDER — SODIUM CHLORIDE 9 MG/ML
1000 INJECTION, SOLUTION INTRAVENOUS
Refills: 0 | Status: DISCONTINUED | OUTPATIENT
Start: 2021-11-24 | End: 2021-11-24

## 2021-11-24 RX ORDER — GABAPENTIN 400 MG/1
300 CAPSULE ORAL ONCE
Refills: 0 | Status: COMPLETED | OUTPATIENT
Start: 2021-11-24 | End: 2021-11-24

## 2021-11-24 RX ORDER — POTASSIUM CITRATE MONOHYDRATE 100 %
0 POWDER (GRAM) MISCELLANEOUS
Qty: 0 | Refills: 0 | DISCHARGE

## 2021-11-24 RX ORDER — OMEGA-3 ACID ETHYL ESTERS 1 G
0 CAPSULE ORAL
Qty: 0 | Refills: 0 | DISCHARGE

## 2021-11-24 RX ORDER — CELECOXIB 200 MG/1
400 CAPSULE ORAL ONCE
Refills: 0 | Status: COMPLETED | OUTPATIENT
Start: 2021-11-24 | End: 2021-11-24

## 2021-11-24 RX ORDER — MULTIVIT-MIN/FERROUS GLUCONATE 9 MG/15 ML
1 LIQUID (ML) ORAL
Qty: 0 | Refills: 0 | DISCHARGE

## 2021-11-24 RX ORDER — CHOLECALCIFEROL (VITAMIN D3) 125 MCG
1 CAPSULE ORAL
Qty: 0 | Refills: 0 | DISCHARGE

## 2021-11-24 RX ORDER — ALLOPURINOL 300 MG
1 TABLET ORAL
Qty: 0 | Refills: 0 | DISCHARGE

## 2021-11-24 RX ORDER — ACETAMINOPHEN 500 MG
2 TABLET ORAL
Qty: 0 | Refills: 0 | DISCHARGE

## 2021-11-24 RX ORDER — IBUPROFEN 200 MG
1 TABLET ORAL
Qty: 0 | Refills: 0 | DISCHARGE

## 2021-11-24 RX ADMIN — GABAPENTIN 300 MILLIGRAM(S): 400 CAPSULE ORAL at 08:16

## 2021-11-24 RX ADMIN — SODIUM CHLORIDE 75 MILLILITER(S): 9 INJECTION, SOLUTION INTRAVENOUS at 13:53

## 2021-11-24 RX ADMIN — CELECOXIB 400 MILLIGRAM(S): 200 CAPSULE ORAL at 08:16

## 2021-11-24 RX ADMIN — FENTANYL CITRATE 50 MICROGRAM(S): 50 INJECTION INTRAVENOUS at 14:28

## 2021-11-24 RX ADMIN — Medication 100 MILLIGRAM(S): at 11:00

## 2021-11-24 RX ADMIN — FENTANYL CITRATE 50 MICROGRAM(S): 50 INJECTION INTRAVENOUS at 13:46

## 2021-11-24 RX ADMIN — Medication 975 MILLIGRAM(S): at 08:16

## 2021-11-24 RX ADMIN — FENTANYL CITRATE 50 MICROGRAM(S): 50 INJECTION INTRAVENOUS at 14:01

## 2021-11-24 NOTE — ASU DISCHARGE PLAN (ADULT/PEDIATRIC) - CALL YOUR DOCTOR IF YOU HAVE ANY OF THE FOLLOWING:
chest pain, shortness of breath/Bleeding that does not stop/Swelling that gets worse/Pain not relieved by Medications/Fever greater than (need to indicate Fahrenheit or Celsius)/Wound/Surgical Site with redness, or foul smelling discharge or pus/Numbness, tingling, color or temperature change to extremity/Nausea and vomiting that does not stop/Unable to urinate/Excessive diarrhea/Inability to tolerate liquids or foods/Increased irritability or sluggishness

## 2021-11-24 NOTE — ASU DISCHARGE PLAN (ADULT/PEDIATRIC) - CARE PROVIDER_API CALL
Martin Morris)  Surgery  [address] [facility] [facility] [address], [address]st [place] [address]  Phone: [phone]  Fax: [phone]  Follow Up Time: 2 weeks

## 2021-11-24 NOTE — BRIEF OPERATIVE NOTE - OPERATION/FINDINGS
Veress needle used to inflate initially. 3 8mm port sites made. Flap created. Self adhering mesh placed bilaterally and flap closed. Umbilical hernia repaired open with 0 ethabond. Closed with 4-0 monocryl and dermabond.

## 2021-11-29 PROBLEM — Z85.828 PERSONAL HISTORY OF OTHER MALIGNANT NEOPLASM OF SKIN: Chronic | Status: ACTIVE | Noted: 2021-11-08

## 2021-11-29 PROBLEM — Z87.442 PERSONAL HISTORY OF URINARY CALCULI: Chronic | Status: ACTIVE | Noted: 2021-11-08

## 2021-11-29 PROBLEM — E06.3 AUTOIMMUNE THYROIDITIS: Chronic | Status: ACTIVE | Noted: 2021-11-08

## 2021-11-29 PROBLEM — Z92.29 PERSONAL HISTORY OF OTHER DRUG THERAPY: Chronic | Status: ACTIVE | Noted: 2021-11-08

## 2021-12-06 ENCOUNTER — APPOINTMENT (OUTPATIENT)
Dept: SURGERY | Facility: CLINIC | Age: 61
End: 2021-12-06
Payer: COMMERCIAL

## 2021-12-06 VITALS
HEIGHT: 72 IN | BODY MASS INDEX: 23.3 KG/M2 | HEART RATE: 96 BPM | SYSTOLIC BLOOD PRESSURE: 144 MMHG | RESPIRATION RATE: 16 BRPM | OXYGEN SATURATION: 96 % | DIASTOLIC BLOOD PRESSURE: 83 MMHG | TEMPERATURE: 97.6 F | WEIGHT: 172 LBS

## 2021-12-06 PROCEDURE — 99024 POSTOP FOLLOW-UP VISIT: CPT

## 2021-12-06 NOTE — PHYSICAL EXAM
[JVD] : no jugular venous distention  [Normal Breath Sounds] : Normal breath sounds [Normal Heart Sounds] : normal heart sounds [Normal Rate and Rhythm] : normal rate and rhythm [No HSM] : no hepatosplenomegaly [No Rash or Lesion] : No rash or lesion [Alert] : alert [Oriented to Person] : oriented to person [Oriented to Place] : oriented to place [Oriented to Time] : oriented to time [Calm] : calm [de-identified] : well-appearing, NAD [de-identified] : RASHARD [de-identified] : soft, ND, NT, incisions c/d/i, + left sided seroma in groin, no recurrence. [de-identified] : no CVAT [de-identified] : full ROM

## 2021-12-06 NOTE — HISTORY OF PRESENT ILLNESS
[de-identified] : 60M presents with 10month history of L groin bulge that is increasingly bothersome. Worse with valsalva and prolonged standing. No dysuria.\par No episodes of incarceration or acute changes in bowel function.\par No nausea/vomiting.\par No blood per rectum\par No testicular pain.\par Long-standing umbilical hernia as well. [de-identified] : Here for postop follow-up. Doing well. Having some L groin pain that is resolving, started after raking leaves. No fevers/chills. Normal BMs.

## 2022-08-15 ENCOUNTER — APPOINTMENT (OUTPATIENT)
Dept: DERMATOLOGY | Facility: CLINIC | Age: 62
End: 2022-08-15

## 2022-08-15 PROCEDURE — 99203 OFFICE O/P NEW LOW 30 MIN: CPT | Mod: 25

## 2022-08-15 PROCEDURE — 17110 DESTRUCTION B9 LES UP TO 14: CPT

## 2022-08-15 PROCEDURE — 17000 DESTRUCT PREMALG LESION: CPT | Mod: 59

## 2023-04-07 ENCOUNTER — APPOINTMENT (OUTPATIENT)
Dept: NEPHROLOGY | Facility: CLINIC | Age: 63
End: 2023-04-07
Payer: COMMERCIAL

## 2023-04-07 ENCOUNTER — NON-APPOINTMENT (OUTPATIENT)
Age: 63
End: 2023-04-07

## 2023-04-07 VITALS
HEIGHT: 72 IN | HEART RATE: 100 BPM | DIASTOLIC BLOOD PRESSURE: 96 MMHG | SYSTOLIC BLOOD PRESSURE: 150 MMHG | WEIGHT: 173 LBS | OXYGEN SATURATION: 95 % | BODY MASS INDEX: 23.43 KG/M2

## 2023-04-07 PROCEDURE — 99205 OFFICE O/P NEW HI 60 MIN: CPT

## 2023-04-07 RX ORDER — TAMSULOSIN HYDROCHLORIDE 0.4 MG/1
0.4 CAPSULE ORAL
Qty: 30 | Refills: 1 | Status: DISCONTINUED | COMMUNITY
Start: 2018-03-02 | End: 2023-04-07

## 2023-04-07 RX ORDER — HYDROCHLOROTHIAZIDE 25 MG/1
25 TABLET ORAL DAILY
Qty: 90 | Refills: 0 | Status: DISCONTINUED | COMMUNITY
Start: 2019-10-14 | End: 2023-04-07

## 2023-04-07 NOTE — ASSESSMENT
[FreeTextEntry1] : Nephrolithiasis (592.0) (N20.0)\par \par Renal US : \par Right kidney: 11.5 x 4.9 x 5.1 cm \par Cortical Thickness: 1.6 cm UP 1.6 cm LP \par \par Left kidney: 12.3 x 5.4 x 4.9 cm		 \par Cortical Thickness: 1.6 cm UP 1.7 cm LP \par  \par Echogenicity: Normal\par \par Bladder: Not visualized \par \par Findings:Again visualized a 2.5 cm x 1.9 cm x 2.6 cm exophytic cyst with a 8.2 mm x 5.5 mm calcification in mid pole of the right kidney. Again visualized a 1.1 cm x 1.1 cm 1.2 cm simple cyst in the lower pole of the left kidney. Both kidneys are normal in size and echogenicity without hydronephrosis, stones or solid masses visualized. \par \par Asymptomatic \par \par Rec : 24 Hr., UP , If + Serology, \par

## 2023-04-07 NOTE — HISTORY OF PRESENT ILLNESS
[FreeTextEntry1] : Nephrolithiasis (592.0) (N20.0)  Renal US today: Right kidney: 11.5 x 4.9 x 5.1 cm  Cortical Thickness: 1.6 cm UP 1.6 cm LP   Left kidney: 12.3 x 5.4 x 4.9 cm		  Cortical Thickness: 1.6 cm UP 1.7 cm LP    Echogenicity: Normal  Bladder: Not visualized   Findings:Again visualized a 2.5 cm x 1.9 cm x 2.6 cm exophytic cyst with a 8.2 mm x 5.5 mm calcification in mid pole of the right kidney. Again visualized a 1.1 cm x 1.1 cm 1.2 cm simple cyst in the lower pole of the left kidney. Both kidneys are normal in size and echogenicity without hydronephrosis, stones or solid masses visualized.  LL discussed previously  Asymptomatic  PSA ordered  LL in 5 months and US in 6 months. \par \par Here for Malcolm., Of UP + ( Sine 7-2022 )\par \par Noj Smoker, Not Obese, Denies NSAID Use, \par \par FH : neg., for ESRD,

## 2023-04-20 ENCOUNTER — NON-APPOINTMENT (OUTPATIENT)
Age: 63
End: 2023-04-20

## 2023-04-21 ENCOUNTER — NON-APPOINTMENT (OUTPATIENT)
Age: 63
End: 2023-04-21

## 2023-04-24 DIAGNOSIS — K42.9 UMBILICAL HERNIA W/OUT OBSTRUCTION OR GANGRENE: ICD-10-CM

## 2023-04-25 ENCOUNTER — NON-APPOINTMENT (OUTPATIENT)
Age: 63
End: 2023-04-25

## 2023-04-25 DIAGNOSIS — Z12.5 ENCOUNTER FOR SCREENING FOR MALIGNANT NEOPLASM OF PROSTATE: ICD-10-CM

## 2023-04-25 DIAGNOSIS — K40.90 UNILATERAL INGUINAL HERNIA, W/OUT OBSTRUCTION OR GANGRENE, NOT SPECIFIED AS RECURRENT: ICD-10-CM

## 2023-04-25 DIAGNOSIS — N20.0 CALCULUS OF KIDNEY: ICD-10-CM

## 2023-04-25 DIAGNOSIS — R80.9 PROTEINURIA, UNSPECIFIED: ICD-10-CM

## 2023-04-25 DIAGNOSIS — Z87.448 PERSONAL HISTORY OF OTHER DISEASES OF URINARY SYSTEM: ICD-10-CM

## 2023-04-25 DIAGNOSIS — Z01.818 ENCOUNTER FOR OTHER PREPROCEDURAL EXAMINATION: ICD-10-CM

## 2023-04-28 ENCOUNTER — NON-APPOINTMENT (OUTPATIENT)
Age: 63
End: 2023-04-28

## 2023-04-28 ENCOUNTER — OFFICE (OUTPATIENT)
Dept: URBAN - METROPOLITAN AREA CLINIC 115 | Facility: CLINIC | Age: 63
Setting detail: OPHTHALMOLOGY
End: 2023-04-28
Payer: COMMERCIAL

## 2023-04-28 DIAGNOSIS — Q13.2: ICD-10-CM

## 2023-04-28 DIAGNOSIS — H25.13: ICD-10-CM

## 2023-04-28 PROCEDURE — 92014 COMPRE OPH EXAM EST PT 1/>: CPT | Performed by: OPTOMETRIST

## 2023-04-28 RX ORDER — LOSARTAN POTASSIUM 100 MG/1
100 TABLET, FILM COATED ORAL DAILY
Qty: 90 | Refills: 1 | Status: ACTIVE | COMMUNITY
Start: 2023-04-24 | End: 1900-01-01

## 2023-04-28 ASSESSMENT — SPHEQUIV_DERIVED
OS_SPHEQUIV: -0.125
OD_SPHEQUIV: 0.25
OD_SPHEQUIV: 0.25
OS_SPHEQUIV: -0.125

## 2023-04-28 ASSESSMENT — AXIALLENGTH_DERIVED
OS_AL: 24.4021
OD_AL: 24.4424
OD_AL: 24.4424
OS_AL: 24.4021

## 2023-04-28 ASSESSMENT — REFRACTION_MANIFEST
OD_SPHERE: +0.50
OS_SPHERE: 0.00
OS_CYLINDER: -0.25
OD_AXIS: 093
OS_AXIS: 115
OD_CYLINDER: -0.50

## 2023-04-28 ASSESSMENT — KERATOMETRY
OS_K2POWER_DIOPTERS: 41.50
OD_K2POWER_DIOPTERS: 41.25
OS_K1POWER_DIOPTERS: 41.50
OD_AXISANGLE_DEGREES: 180
METHOD_AUTO_MANUAL: AUTO
OD_K1POWER_DIOPTERS: 40.75
OS_AXISANGLE_DEGREES: 090

## 2023-04-28 ASSESSMENT — REFRACTION_AUTOREFRACTION
OS_SPHERE: 0.00
OD_AXIS: 093
OD_CYLINDER: -0.50
OS_CYLINDER: -0.25
OS_AXIS: 115
OD_SPHERE: +0.50

## 2023-04-28 ASSESSMENT — VISUAL ACUITY
OS_BCVA: 20/20
OD_BCVA: 20/20

## 2023-04-28 ASSESSMENT — CONFRONTATIONAL VISUAL FIELD TEST (CVF)
OD_FINDINGS: FULL
OS_FINDINGS: FULL

## 2023-04-28 ASSESSMENT — TONOMETRY
OD_IOP_MMHG: 19
OS_IOP_MMHG: 20

## 2023-08-28 ENCOUNTER — APPOINTMENT (OUTPATIENT)
Dept: DERMATOLOGY | Facility: CLINIC | Age: 63
End: 2023-08-28

## 2024-04-05 NOTE — ASU PREOP CHECKLIST - ANTIBIOTIC
Patient transferred from PACU to inpatient room 368. Patient vitally stable and resting comfortably in bed. Bedside report given to OTF Bermeo. All questions and concerns were addressed at this time.   yes

## 2025-01-31 ENCOUNTER — NON-APPOINTMENT (OUTPATIENT)
Age: 65
End: 2025-01-31

## 2025-02-10 ENCOUNTER — TRANSCRIPTION ENCOUNTER (OUTPATIENT)
Age: 65
End: 2025-02-10

## 2025-02-10 ENCOUNTER — APPOINTMENT (OUTPATIENT)
Dept: SURGERY | Facility: CLINIC | Age: 65
End: 2025-02-10
Payer: COMMERCIAL

## 2025-02-10 ENCOUNTER — NON-APPOINTMENT (OUTPATIENT)
Age: 65
End: 2025-02-10

## 2025-02-10 VITALS
HEART RATE: 102 BPM | OXYGEN SATURATION: 96 % | RESPIRATION RATE: 16 BRPM | WEIGHT: 172 LBS | SYSTOLIC BLOOD PRESSURE: 113 MMHG | HEIGHT: 72 IN | TEMPERATURE: 97.6 F | DIASTOLIC BLOOD PRESSURE: 79 MMHG | BODY MASS INDEX: 23.3 KG/M2

## 2025-02-10 DIAGNOSIS — K40.90 UNILATERAL INGUINAL HERNIA, W/OUT OBSTRUCTION OR GANGRENE, NOT SPECIFIED AS RECURRENT: ICD-10-CM

## 2025-02-10 DIAGNOSIS — Z78.9 OTHER SPECIFIED HEALTH STATUS: ICD-10-CM

## 2025-02-10 PROCEDURE — 99204 OFFICE O/P NEW MOD 45 MIN: CPT

## 2025-02-10 RX ORDER — MULTIVIT WITH MIN/ALA/HERBS 50 MG
TABLET ORAL
Refills: 0 | Status: ACTIVE | COMMUNITY

## 2025-02-10 RX ORDER — UBIDECARENONE 50 MG
CAPSULE ORAL
Refills: 0 | Status: ACTIVE | COMMUNITY

## 2025-02-10 RX ORDER — LEVOTHYROXINE SODIUM 112 UG/1
112 TABLET ORAL
Refills: 0 | Status: ACTIVE | COMMUNITY

## 2025-02-26 ENCOUNTER — OUTPATIENT (OUTPATIENT)
Dept: OUTPATIENT SERVICES | Facility: HOSPITAL | Age: 65
LOS: 1 days | End: 2025-02-26
Payer: COMMERCIAL

## 2025-02-26 VITALS
TEMPERATURE: 98 F | SYSTOLIC BLOOD PRESSURE: 118 MMHG | HEART RATE: 66 BPM | RESPIRATION RATE: 18 BRPM | HEIGHT: 74 IN | WEIGHT: 180.78 LBS | OXYGEN SATURATION: 96 % | DIASTOLIC BLOOD PRESSURE: 64 MMHG

## 2025-02-26 DIAGNOSIS — Z98.890 OTHER SPECIFIED POSTPROCEDURAL STATES: Chronic | ICD-10-CM

## 2025-02-26 DIAGNOSIS — Z87.898 PERSONAL HISTORY OF OTHER SPECIFIED CONDITIONS: Chronic | ICD-10-CM

## 2025-02-26 DIAGNOSIS — R73.03 PREDIABETES: ICD-10-CM

## 2025-02-26 DIAGNOSIS — E06.3 AUTOIMMUNE THYROIDITIS: ICD-10-CM

## 2025-02-26 DIAGNOSIS — Z01.818 ENCOUNTER FOR OTHER PREPROCEDURAL EXAMINATION: ICD-10-CM

## 2025-02-26 DIAGNOSIS — I10 ESSENTIAL (PRIMARY) HYPERTENSION: ICD-10-CM

## 2025-02-26 DIAGNOSIS — Z29.9 ENCOUNTER FOR PROPHYLACTIC MEASURES, UNSPECIFIED: ICD-10-CM

## 2025-02-26 DIAGNOSIS — K40.90 UNILATERAL INGUINAL HERNIA, WITHOUT OBSTRUCTION OR GANGRENE, NOT SPECIFIED AS RECURRENT: ICD-10-CM

## 2025-02-26 LAB
A1C WITH ESTIMATED AVERAGE GLUCOSE RESULT: 5.8 % — HIGH (ref 4–5.6)
ALBUMIN SERPL ELPH-MCNC: 4.3 G/DL — SIGNIFICANT CHANGE UP (ref 3.3–5.2)
ALP SERPL-CCNC: 69 U/L — SIGNIFICANT CHANGE UP (ref 40–120)
ALT FLD-CCNC: 17 U/L — SIGNIFICANT CHANGE UP
ANION GAP SERPL CALC-SCNC: 12 MMOL/L — SIGNIFICANT CHANGE UP (ref 5–17)
APTT BLD: 32.4 SEC — SIGNIFICANT CHANGE UP (ref 24.5–35.6)
AST SERPL-CCNC: 24 U/L — SIGNIFICANT CHANGE UP
BASOPHILS # BLD AUTO: 0.08 K/UL — SIGNIFICANT CHANGE UP (ref 0–0.2)
BASOPHILS NFR BLD AUTO: 0.9 % — SIGNIFICANT CHANGE UP (ref 0–2)
BILIRUB SERPL-MCNC: 0.5 MG/DL — SIGNIFICANT CHANGE UP (ref 0.4–2)
BLD GP AB SCN SERPL QL: SIGNIFICANT CHANGE UP
BUN SERPL-MCNC: 24.3 MG/DL — HIGH (ref 8–20)
CALCIUM SERPL-MCNC: 9.8 MG/DL — SIGNIFICANT CHANGE UP (ref 8.4–10.5)
CHLORIDE SERPL-SCNC: 102 MMOL/L — SIGNIFICANT CHANGE UP (ref 96–108)
CO2 SERPL-SCNC: 24 MMOL/L — SIGNIFICANT CHANGE UP (ref 22–29)
CREAT SERPL-MCNC: 1.35 MG/DL — HIGH (ref 0.5–1.3)
EGFR: 58 ML/MIN/1.73M2 — LOW
EOSINOPHIL # BLD AUTO: 0.47 K/UL — SIGNIFICANT CHANGE UP (ref 0–0.5)
EOSINOPHIL NFR BLD AUTO: 5 % — SIGNIFICANT CHANGE UP (ref 0–6)
ESTIMATED AVERAGE GLUCOSE: 120 MG/DL — HIGH (ref 68–114)
GLUCOSE SERPL-MCNC: 103 MG/DL — HIGH (ref 70–99)
HCT VFR BLD CALC: 46.6 % — SIGNIFICANT CHANGE UP (ref 39–50)
HGB BLD-MCNC: 15.6 G/DL — SIGNIFICANT CHANGE UP (ref 13–17)
IMM GRANULOCYTES # BLD AUTO: 0.02 K/UL — SIGNIFICANT CHANGE UP (ref 0–0.07)
IMM GRANULOCYTES NFR BLD AUTO: 0.2 % — SIGNIFICANT CHANGE UP (ref 0–0.9)
INR BLD: 0.96 RATIO — SIGNIFICANT CHANGE UP (ref 0.85–1.16)
LYMPHOCYTES # BLD AUTO: 3.32 K/UL — HIGH (ref 1–3.3)
LYMPHOCYTES NFR BLD AUTO: 35.5 % — SIGNIFICANT CHANGE UP (ref 13–44)
MCHC RBC-ENTMCNC: 31.6 PG — SIGNIFICANT CHANGE UP (ref 27–34)
MCHC RBC-ENTMCNC: 33.5 G/DL — SIGNIFICANT CHANGE UP (ref 32–36)
MCV RBC AUTO: 94.3 FL — SIGNIFICANT CHANGE UP (ref 80–100)
MONOCYTES # BLD AUTO: 0.63 K/UL — SIGNIFICANT CHANGE UP (ref 0–0.9)
MONOCYTES NFR BLD AUTO: 6.7 % — SIGNIFICANT CHANGE UP (ref 2–14)
MRSA PCR RESULT.: SIGNIFICANT CHANGE UP
NEUTROPHILS # BLD AUTO: 4.84 K/UL — SIGNIFICANT CHANGE UP (ref 1.8–7.4)
NEUTROPHILS NFR BLD AUTO: 51.7 % — SIGNIFICANT CHANGE UP (ref 43–77)
NRBC # BLD AUTO: 0 K/UL — SIGNIFICANT CHANGE UP (ref 0–0)
NRBC # FLD: 0 K/UL — SIGNIFICANT CHANGE UP (ref 0–0)
NRBC BLD AUTO-RTO: 0 /100 WBCS — SIGNIFICANT CHANGE UP (ref 0–0)
PLATELET # BLD AUTO: 182 K/UL — SIGNIFICANT CHANGE UP (ref 150–400)
PMV BLD: 11.2 FL — SIGNIFICANT CHANGE UP (ref 7–13)
POTASSIUM SERPL-MCNC: 4.2 MMOL/L — SIGNIFICANT CHANGE UP (ref 3.5–5.3)
POTASSIUM SERPL-SCNC: 4.2 MMOL/L — SIGNIFICANT CHANGE UP (ref 3.5–5.3)
PROT SERPL-MCNC: 7.2 G/DL — SIGNIFICANT CHANGE UP (ref 6.6–8.7)
PROTHROM AB SERPL-ACNC: 10.8 SEC — SIGNIFICANT CHANGE UP (ref 9.9–13.4)
RBC # BLD: 4.94 M/UL — SIGNIFICANT CHANGE UP (ref 4.2–5.8)
RBC # FLD: 13.5 % — SIGNIFICANT CHANGE UP (ref 10.3–14.5)
S AUREUS DNA NOSE QL NAA+PROBE: SIGNIFICANT CHANGE UP
SODIUM SERPL-SCNC: 138 MMOL/L — SIGNIFICANT CHANGE UP (ref 135–145)
WBC # BLD: 9.36 K/UL — SIGNIFICANT CHANGE UP (ref 3.8–10.5)
WBC # FLD AUTO: 9.36 K/UL — SIGNIFICANT CHANGE UP (ref 3.8–10.5)

## 2025-02-26 PROCEDURE — 87640 STAPH A DNA AMP PROBE: CPT

## 2025-02-26 PROCEDURE — 83036 HEMOGLOBIN GLYCOSYLATED A1C: CPT

## 2025-02-26 PROCEDURE — 86900 BLOOD TYPING SEROLOGIC ABO: CPT

## 2025-02-26 PROCEDURE — 36415 COLL VENOUS BLD VENIPUNCTURE: CPT

## 2025-02-26 PROCEDURE — 86901 BLOOD TYPING SEROLOGIC RH(D): CPT

## 2025-02-26 PROCEDURE — 93005 ELECTROCARDIOGRAM TRACING: CPT

## 2025-02-26 PROCEDURE — 93010 ELECTROCARDIOGRAM REPORT: CPT

## 2025-02-26 PROCEDURE — G0463: CPT

## 2025-02-26 PROCEDURE — 80053 COMPREHEN METABOLIC PANEL: CPT

## 2025-02-26 PROCEDURE — 86850 RBC ANTIBODY SCREEN: CPT

## 2025-02-26 PROCEDURE — 85610 PROTHROMBIN TIME: CPT

## 2025-02-26 PROCEDURE — 87641 MR-STAPH DNA AMP PROBE: CPT

## 2025-02-26 PROCEDURE — 85730 THROMBOPLASTIN TIME PARTIAL: CPT

## 2025-02-26 PROCEDURE — 85025 COMPLETE CBC W/AUTO DIFF WBC: CPT

## 2025-02-26 NOTE — H&P PST ADULT - NSICDXPROCEDURE_GEN_ALL_CORE_FT
PROCEDURES:  Open repair of recurrent inguinal hernia using synthetic mesh 26-Feb-2025 14:45:29  Melissa Sawyer

## 2025-02-26 NOTE — H&P PST ADULT - PROBLEM SELECTOR PLAN 2
-Instructed to refer to Dr. Matamoros on when to stop taking aspirin before surgery  -Instructed patient to take hydrochlorothiazide, and losartan with a sip of water the morning of surgery, no other medications  -Pending medical clearance  -Pending EKG

## 2025-02-26 NOTE — H&P PST ADULT - PROBLEM SELECTOR PLAN 3
-Instructed to refer to Dr. Matamoros on when to stop taking aspirin before surgery  -Instructed patient to take levothyroxine with a sip of water the morning of surgery, no other medications  -Pending medical clearance

## 2025-02-26 NOTE — H&P PST ADULT - NSICDXPASTSURGICALHX_GEN_ALL_CORE_FT
PAST SURGICAL HISTORY:  H/O colonoscopy     History of asthma     History of bilateral inguinal hernia repair     History of lithotripsy     History of squamous cell carcinoma excision scalp    Status post Mohs surgery torso, nose     PAST SURGICAL HISTORY:  H/O colonoscopy     History of bilateral inguinal hernia repair     History of lithotripsy     History of squamous cell carcinoma excision scalp    Status post Mohs surgery torso, nose

## 2025-02-26 NOTE — H&P PST ADULT - NSICDXPASTMEDICALHX_GEN_ALL_CORE_FT
PAST MEDICAL HISTORY:  Basal cell carcinoma     COVID-19 vaccine series completed moderna x3    H/O squamous cell carcinoma of skin     Hashimoto's thyroiditis     History of kidney stones     HTN (hypertension)     Squamous cell carcinoma of skin

## 2025-02-26 NOTE — H&P PST ADULT - ASSESSMENT
Minocycline Counseling: Patient advised regarding possible photosensitivity and discoloration of the teeth, skin, lips, tongue and gums.  Patient instructed to avoid sunlight, if possible.  When exposed to sunlight, patients should wear protective clothing, sunglasses, and sunscreen.  The patient was instructed to call the office immediately if the following severe adverse effects occur:  hearing changes, easy bruising/bleeding, severe headache, or vision changes.  The patient verbalized understanding of the proper use and possible adverse effects of minocycline.  All of the patient's questions and concerns were addressed. Manny Freire is a 64 year old male with PMH significant for hashimoto's thyroiditis, HTN, and kidney stones. Patient is s/p bilateral inguinal hernia repair with mesh in . Patient reports noticing a bulge to his left groin about 2 months ago that is worsened with strenuous activity. Admits to intermittent burning sensation. Denies changes in bowel habits, abdominal pain, testicular pain, fever, or chills. Patient was seen today at [facility] with left inguinal hernia for scheduled open recurrent left inguinal hernia repair with Dr. Morris on 3/18/25.       -Medical evaluation pending  -Patient educated on NPO after midnight  -Instructed to refer to Dr. Matamoros on when to stop taking aspirin before surgery  -Instructed patient to take levothyroxine, hydrochlorothiazide, and losartan with a sip of water the morning of surgery, no other medications  -Educated on NSAIDS, multivitamins and herbals that increase the risk of bleeding and need to be stopped 5 days before procedure  -Educated on infection prevention  -Tylenol can be taken if needed for pain  -Will continue all other medications as prescribed  -Verbalized understanding of all instructions.      CAPRINI SCORE    AGE RELATED RISK FACTORS                                                             [ ] Age 41-60 years                                            (1 Point)  [X ] Age: 61-74 years                                           (2 Points)                 [ ] Age= 75 years                                                (3 Points)             DISEASE RELATED RISK FACTORS                                                       [ ] Edema in the lower extremities                 (1 Point)                     [ ] Varicose veins                                               (1 Point)                                 [ ] BMI > 25 Kg/m2                                            (1 Point)                                  [ ] Serious infection (ie PNA)                            (1 Point)                     [ ] Lung disease ( COPD, Emphysema)            (1 Point)                                                                          [ ] Acute myocardial infarction                         (1 Point)                  [ ] Congestive heart failure (in the previous month)  (1 Point)         [ ] Inflammatory bowel disease                            (1 Point)                  [ ] Central venous access, PICC or Port               (2 points)       (within the last month)                                                                [ ] Stroke (in the previous month)                        (5 Points)    [X ] Previous or present malignancy                       (2 points)                                                                                                                                                         HEMATOLOGY RELATED FACTORS                                                         [ ] Prior episodes of VTE                                     (3 Points)                     [ ] Positive family history for VTE                      (3 Points)                  [ ] Prothrombin 02899 A                                     (3 Points)                     [ ] Factor V Leiden                                                (3 Points)                        [ ] Lupus anticoagulants                                      (3 Points)                                                           [ ] Anticardiolipin antibodies                              (3 Points)                                                       [ ] High homocysteine in the blood                   (3 Points)                                             [ ] Other congenital or acquired thrombophilia      (3 Points)                                                [ ] Heparin induced thrombocytopenia                  (3 Points)                                        MOBILITY RELATED FACTORS  [ ] Bed rest                                                         (1 Point)  [ ] Plaster cast                                                    (2 points)  [ ] Bed bound for more than 72 hours           (2 Points)    GENDER SPECIFIC FACTORS  [ ] Pregnancy or had a baby within the last month   (1 Point)  [ ] Post-partum < 6 weeks                                   (1 Point)  [ ] Hormonal therapy  or oral contraception   (1 Point)  [ ] History of pregnancy complications              (1 point)  [ ] Unexplained or recurrent              (1 Point)    OTHER RISK FACTORS                                           (1 Point)  [ ] BMI >40, smoking, diabetes requiring insulin, chemotherapy  blood transfusions and length of surgery over 2 hours    SURGERY RELATED RISK FACTORS  [ ]  Section within the last month     (1 Point)  [ ] Minor surgery                                                  (1 Point)  [ ] Arthroscopic surgery                                       (2 Points)  [ X] Planned major surgery lasting more            (2 Points)      than 45 minutes     [ ] Elective hip or knee joint replacement       (5 points)       surgery                                                TRAUMA RELATED RISK FACTORS  [ ] Fracture of the hip, pelvis, or leg                       (5 Points)  [ ] Spinal cord injury resulting in paralysis             (5 points)       (in the previous month)    [ ] Paralysis  (less than 1 month)                             (5 Points)  [ ] Multiple Trauma within 1 month                        (5 Points)    Total Score [   6     ]    Caprini Score 0-2: Low Risk, NO VTE prophylaxis required for most patients, encourage ambulation  Caprini Score 3-6: Moderate Risk , pharmacologic VTE prophylaxis is indicated for most patients (in the absence of contraindications)  Caprini Score Greater than or =7: High risk, pharmocologic VTE prophylaxis indicated for most patients (in the absence of contraindications)        OPIOID RISK TOOL    ALONZO EACH BOX THAT APPLIES AND ADD TOTALS AT THE END    FAMILY HISTORY OF SUBSTANCE ABUSE                 FEMALE         MALE                                                Alcohol                             [  ]1 pt          [  ]3pts                                               Illegal Durgs                     [  ]2 pts        [  ]3pts                                               Rx Drugs                           [  ]4 pts        [  ]4 pts    PERSONAL HISTORY OF SUBSTANCE ABUSE                                                                                          Alcohol                             [  ]3 pts       [  ]3 pts                                               Illegal Drugs                     [  ]4 pts        [  ]4 pts                                               Rx Drugs                           [  ]5 pts        [  ]5 pts    AGE BETWEEN 16-45 YEARS                                      [  ]1 pt         [  ]1 pt    HISTORY OF PREADOLESCENT   SEXUAL ABUSE                                                             [  ]3 pts        [  ]0pts    PSYCHOLOGICAL DISEASE                     ADD, OCD, Bipolar, Schizophrenia        [  ]2 pts         [  ]2 pts                      Depression                                               [  ]1 pt           [  ]1 pt           SCORING TOTAL   (add numbers and type here)              (0)                                     A score of 3 or lower indicated LOW risk for future opioid abuse  A score of 4 to 7 indicated moderate risk for future opioid abuse  A score of 8 or higher indicates a high risk for opioid abuse

## 2025-02-26 NOTE — H&P PST ADULT - NSICDXFAMILYHX_GEN_ALL_CORE_FT
FAMILY HISTORY:  Mother  Still living? Unknown  FH: breast cancer, Age at diagnosis: Age Unknown  FH: rheumatoid arthritis, Age at diagnosis: Age Unknown    Grandparent  Still living? Unknown  FH: colon cancer, Age at diagnosis: Age Unknown  FH: kidney cancer, Age at diagnosis: Age Unknown

## 2025-02-26 NOTE — H&P PST ADULT - PROBLEM SELECTOR PLAN 1
Manny Freire is a 64 year old male who was seen today at [facility] with left inguinal hernia for scheduled open recurrent left inguinal hernia repair with Dr. Morris on 3/18/25.       -Pending medical clearance  -Pending labs, EKG, MRSA/MSSA

## 2025-02-26 NOTE — H&P PST ADULT - HISTORY OF PRESENT ILLNESS
Manny Freire is a 64 year old male with PMH significant for       Patient returns over 3 years status post previous robotic left inguinal hernia repair. Over the past month he has experienced a recurrent bulge in the left groin that is worsened with Valsalva and sudden straining. He denies episodes of incarceration or nausea/vomiting. He has had normal bowel function throughout this time. No testicular pain. Manny Freire is a 64 year old male with PMH significant for       Patient returns over 3 years status post previous robotic left inguinal hernia repair. Over the past month he has experienced a recurrent bulge in the left groin that is worsened with Valsalva and sudden straining. He denies episodes of incarceration or nausea/vomiting. He has had normal bowel function throughout this time. No testicular pain.      Noticed a bulge to his left groin x 2 months ago, progressively experiencing burning sensation    Manny Freire is a 64 year old male with PMH significant for hashimoto's thyroiditis, HTN, and kidney stones. Patient is s/p bilateral inguinal hernia repair with mesh in 2021. Patient reports noticing a bulge to his left groin about 2 months ago that is worsened with strenuous activity. Admits to intermittent burning sensation. Denies changes in bowel habits, abdominal pain, testicular pain, fever, or chills. Patient was seen today at [facility] with left inguinal hernia for scheduled open recurrent left inguinal hernia repair with Dr. Morris on 3/18/25.      Manny Freire is a 64 year old male with PMH significant for hashimoto's thyroiditis, HTN, prediabetes (diet controlled) and kidney stones. Patient is s/p bilateral inguinal hernia repair with mesh in 2021. Patient reports noticing a bulge to his left groin about 2 months ago that is worsened with strenuous activity. Admits to intermittent burning sensation. Denies changes in bowel habits, abdominal pain, testicular pain, fever, or chills. Patient was seen today at [facility] with left inguinal hernia for scheduled open recurrent left inguinal hernia repair with Dr. Morris on 3/18/25.

## 2025-03-18 ENCOUNTER — OUTPATIENT (OUTPATIENT)
Dept: INPATIENT UNIT | Facility: HOSPITAL | Age: 65
LOS: 1 days | End: 2025-03-18
Payer: COMMERCIAL

## 2025-03-18 ENCOUNTER — TRANSCRIPTION ENCOUNTER (OUTPATIENT)
Age: 65
End: 2025-03-18

## 2025-03-18 ENCOUNTER — RESULT REVIEW (OUTPATIENT)
Age: 65
End: 2025-03-18

## 2025-03-18 ENCOUNTER — APPOINTMENT (OUTPATIENT)
Dept: SURGERY | Facility: HOSPITAL | Age: 65
End: 2025-03-18

## 2025-03-18 VITALS
TEMPERATURE: 97 F | HEIGHT: 74 IN | OXYGEN SATURATION: 95 % | RESPIRATION RATE: 16 BRPM | DIASTOLIC BLOOD PRESSURE: 83 MMHG | SYSTOLIC BLOOD PRESSURE: 126 MMHG | HEART RATE: 83 BPM | WEIGHT: 180.78 LBS

## 2025-03-18 VITALS
OXYGEN SATURATION: 100 % | TEMPERATURE: 98 F | RESPIRATION RATE: 10 BRPM | DIASTOLIC BLOOD PRESSURE: 81 MMHG | SYSTOLIC BLOOD PRESSURE: 105 MMHG | HEART RATE: 61 BPM

## 2025-03-18 DIAGNOSIS — Z01.818 ENCOUNTER FOR OTHER PREPROCEDURAL EXAMINATION: ICD-10-CM

## 2025-03-18 DIAGNOSIS — Z98.890 OTHER SPECIFIED POSTPROCEDURAL STATES: Chronic | ICD-10-CM

## 2025-03-18 DIAGNOSIS — K40.90 UNILATERAL INGUINAL HERNIA, WITHOUT OBSTRUCTION OR GANGRENE, NOT SPECIFIED AS RECURRENT: ICD-10-CM

## 2025-03-18 PROCEDURE — 55520 REMOVAL OF SPERM CORD LESION: CPT | Mod: 59,LT

## 2025-03-18 PROCEDURE — 88302 TISSUE EXAM BY PATHOLOGIST: CPT | Mod: 26

## 2025-03-18 PROCEDURE — 88302 TISSUE EXAM BY PATHOLOGIST: CPT

## 2025-03-18 PROCEDURE — 49520 REREPAIR ING HERNIA REDUCE: CPT | Mod: LT

## 2025-03-18 PROCEDURE — C9399: CPT

## 2025-03-18 PROCEDURE — C1781: CPT

## 2025-03-18 PROCEDURE — 49520 REREPAIR ING HERNIA REDUCE: CPT

## 2025-03-18 PROCEDURE — 55520 REMOVAL OF SPERM CORD LESION: CPT | Mod: 59

## 2025-03-18 DEVICE — MESH HERNIA PERFIX PLUG LARGE 1.6 X 1.90": Type: IMPLANTABLE DEVICE | Site: LEFT | Status: FUNCTIONAL

## 2025-03-18 RX ORDER — ONDANSETRON HCL/PF 4 MG/2 ML
8 VIAL (ML) INJECTION ONCE
Refills: 0 | Status: DISCONTINUED | OUTPATIENT
Start: 2025-03-18 | End: 2025-03-18

## 2025-03-18 RX ORDER — SODIUM CHLORIDE 9 G/1000ML
1000 INJECTION, SOLUTION INTRAVENOUS
Refills: 0 | Status: DISCONTINUED | OUTPATIENT
Start: 2025-03-18 | End: 2025-03-18

## 2025-03-18 RX ORDER — FENTANYL CITRATE-0.9 % NACL/PF 100MCG/2ML
25 SYRINGE (ML) INTRAVENOUS ONCE
Refills: 0 | Status: DISCONTINUED | OUTPATIENT
Start: 2025-03-18 | End: 2025-03-18

## 2025-03-18 RX ORDER — FENTANYL CITRATE-0.9 % NACL/PF 100MCG/2ML
50 SYRINGE (ML) INTRAVENOUS ONCE
Refills: 0 | Status: DISCONTINUED | OUTPATIENT
Start: 2025-03-18 | End: 2025-03-18

## 2025-03-18 RX ORDER — CEFAZOLIN SODIUM IN 0.9 % NACL 3 G/100 ML
2000 INTRAVENOUS SOLUTION, PIGGYBACK (ML) INTRAVENOUS ONCE
Refills: 0 | Status: DISCONTINUED | OUTPATIENT
Start: 2025-03-18 | End: 2025-03-18

## 2025-03-18 RX ORDER — LOSARTAN POTASSIUM 100 MG/1
1 TABLET, FILM COATED ORAL
Refills: 0 | DISCHARGE

## 2025-03-18 RX ADMIN — SODIUM CHLORIDE 75 MILLILITER(S): 9 INJECTION, SOLUTION INTRAVENOUS at 11:50

## 2025-03-18 RX ADMIN — Medication 3 MILLILITER(S): at 09:20

## 2025-03-18 NOTE — ASU DISCHARGE PLAN (ADULT/PEDIATRIC) - ASU DC SPECIAL INSTRUCTIONSFT
You may shower tomorrow, there is purple glue over incisions, do not peel or scrub  Let warm soapy water wash over incision sites, pat dry after shower  Do not lift more than 15 pounds for 4-6 weeks  Keep abdominal binder on AT ALL TIMES other than when showering for 2 weeks  You may take tylenol every 6 hours as needed for pain  You may take Miralax daily so you are having 1 soft bowel movement per day  You may return to a regular diet  Follow up with Dr. Morris in the office in 2 weeks

## 2025-03-18 NOTE — ASU DISCHARGE PLAN (ADULT/PEDIATRIC) - CARE PROVIDER_API CALL
Martin Morris  Surgery  [address] [place] [id number]  Phone: [phone]  Fax: [phone]  Follow Up Time: 2 weeks

## 2025-03-18 NOTE — BRIEF OPERATIVE NOTE - OPERATION/FINDINGS
L open inguinal hernia repair. Spermatic cord isolated, cord lipoma excised. Hernia with sac reduced. Mesh affixed. Fascia closed with 0 vicryl. Skin closed with 4-0 monocryl and dermabond.

## 2025-03-18 NOTE — BRIEF OPERATIVE NOTE - NSICDXBRIEFPROCEDURE_GEN_ALL_CORE_FT
PROCEDURES:  Repair, hernia, inguinal, open, using mesh, adult 18-Mar-2025 12:01:46 left inguinal hernia Mona Huitron

## 2025-03-18 NOTE — ASU DISCHARGE PLAN (ADULT/PEDIATRIC) - FINANCIAL ASSISTANCE
[facility] provides services at a reduced cost to those who are determined to be eligible through [facility]’s financial assistance program. Information regarding [facility]’s financial assistance program can be found by going to [email or web address] or by calling [phone].

## 2025-03-18 NOTE — ASU PREOP CHECKLIST - HEART RATE (BEATS/MIN)
83 Banner Transposition Flap Text: The defect edges were debeveled with a #15 scalpel blade.  Given the location of the defect and the proximity to free margins a Banner transposition flap was deemed most appropriate.  Using a sterile surgical marker, an appropriate flap drawn around the defect. The area thus outlined was incised deep to adipose tissue with a #15 scalpel blade.  The skin margins were undermined to an appropriate distance in all directions utilizing iris scissors.

## 2025-03-25 LAB — SURGICAL PATHOLOGY STUDY: SIGNIFICANT CHANGE UP

## 2025-03-31 ENCOUNTER — APPOINTMENT (OUTPATIENT)
Dept: SURGERY | Facility: CLINIC | Age: 65
End: 2025-03-31
Payer: COMMERCIAL

## 2025-03-31 VITALS
HEIGHT: 71 IN | TEMPERATURE: 97.5 F | RESPIRATION RATE: 16 BRPM | HEART RATE: 57 BPM | WEIGHT: 174 LBS | DIASTOLIC BLOOD PRESSURE: 68 MMHG | SYSTOLIC BLOOD PRESSURE: 109 MMHG | BODY MASS INDEX: 24.36 KG/M2 | OXYGEN SATURATION: 96 %

## 2025-03-31 PROCEDURE — 99024 POSTOP FOLLOW-UP VISIT: CPT

## 2025-07-14 NOTE — ASU DISCHARGE PLAN (ADULT/PEDIATRIC) - ASU DISCHARGE DATE/TIME
Quality 431: Preventive Care And Screening: Unhealthy Alcohol Use - Screening: Patient not identified as an unhealthy alcohol user when screened for unhealthy alcohol use using a systematic screening method 24-Nov-2021 Quality 130: Documentation Of Current Medications In The Medical Record: Current Medications Documented Quality 110: Preventive Care And Screening: Influenza Immunization: Influenza Immunization previously received during influenza season Quality 226: Preventive Care And Screening: Tobacco Use: Screening And Cessation Intervention: Patient screened for tobacco use and is an ex/non-smoker Quality 134: Screening For Clinical Depression And Follow-Up Plan: The patient was screened for depression and the screen was negative and no follow up required Detail Level: Detailed

## (undated) DEVICE — STAPLER SKIN PROXIMATE

## (undated) DEVICE — SOL IRR POUR H2O 1000ML

## (undated) DEVICE — ELCTR ROCKER SWITCH PENCIL BLUE 10FT

## (undated) DEVICE — VENODYNE/SCD SLEEVE CALF MEDIUM

## (undated) DEVICE — DRAPE GENERAL ENDOSCOPY

## (undated) DEVICE — SOL IRR POUR NS 0.9% 1000ML

## (undated) DEVICE — ELCTR GROUNDING PAD ADULT COVIDIEN

## (undated) DEVICE — PACK MAJOR ABDOMINAL WITH LAP

## (undated) DEVICE — ELCTR BOVIE TIP BLADE INSULATED 4" EDGE

## (undated) DEVICE — GLV 7.5 PROTEXIS (BLUE)

## (undated) DEVICE — GLV 7.5 PROTEXIS (WHITE)

## (undated) DEVICE — WARMING BLANKET UPPER ADULT

## (undated) DEVICE — LIGASURE IMPACT